# Patient Record
Sex: FEMALE | URBAN - METROPOLITAN AREA
[De-identification: names, ages, dates, MRNs, and addresses within clinical notes are randomized per-mention and may not be internally consistent; named-entity substitution may affect disease eponyms.]

---

## 2020-06-04 ENCOUNTER — LAB OUTSIDE AN ENCOUNTER (OUTPATIENT)
Dept: URBAN - METROPOLITAN AREA CLINIC 80 | Facility: CLINIC | Age: 65
End: 2020-06-04

## 2020-06-04 ENCOUNTER — OFFICE VISIT (OUTPATIENT)
Dept: URBAN - METROPOLITAN AREA CLINIC 80 | Facility: CLINIC | Age: 65
End: 2020-06-04
Payer: COMMERCIAL

## 2020-06-04 ENCOUNTER — DASHBOARD ENCOUNTERS (OUTPATIENT)
Age: 65
End: 2020-06-04

## 2020-06-04 DIAGNOSIS — D50.8 OTHER IRON DEFICIENCY ANEMIA: ICD-10-CM

## 2020-06-04 DIAGNOSIS — Z12.11 SCREENING FOR COLON CANCER: ICD-10-CM

## 2020-06-04 DIAGNOSIS — K59.09 OTHER CONSTIPATION: ICD-10-CM

## 2020-06-04 PROCEDURE — G8418 CALC BMI BLW LOW PARAM F/U: HCPCS | Performed by: INTERNAL MEDICINE

## 2020-06-04 PROCEDURE — 99213 OFFICE O/P EST LOW 20 MIN: CPT | Performed by: INTERNAL MEDICINE

## 2020-06-04 PROCEDURE — G9902 PT SCRN TBCO AND ID AS USER: HCPCS | Performed by: INTERNAL MEDICINE

## 2020-06-04 PROCEDURE — 3017F COLORECTAL CA SCREEN DOC REV: CPT | Performed by: INTERNAL MEDICINE

## 2020-06-04 NOTE — PHYSICAL EXAM CONSTITUTIONAL:
Underweight/frail, using wheeling walker , in no acute distress , ambulating without difficulty , normal communication ability

## 2020-06-04 NOTE — HPI-TODAY'S VISIT:
Unclear as to reason for visit today however on review of labs +mild anemia; no overt bleeding and no hx GI eval or colonoscopy Pt states primary complaint is numbness of her hands and feet; she is using a rolling walker +constipation, responds to miralax

## 2020-06-11 ENCOUNTER — WEB ENCOUNTER (OUTPATIENT)
Dept: URBAN - METROPOLITAN AREA CLINIC 80 | Facility: CLINIC | Age: 65
End: 2020-06-11

## 2020-06-13 LAB
BASO (ABSOLUTE): 0
BASOS: 0
EOS (ABSOLUTE): 0.2
EOS: 2
FERRITIN, SERUM: 36
FOLATE (FOLIC ACID), SERUM: 16.3
HEMATOCRIT: 32.3
HEMATOLOGY COMMENTS:: (no result)
HEMOGLOBIN: 10.5
IMMATURE CELLS: (no result)
IMMATURE GRANS (ABS): 0
IMMATURE GRANULOCYTES: 0
IRON BIND.CAP.(TIBC): 318
IRON SATURATION: 8
IRON: 26
LYMPHS (ABSOLUTE): 1.3
LYMPHS: 12
MCH: 31.2
MCHC: 32.5
MCV: 96
MONOCYTES(ABSOLUTE): 0.9
MONOCYTES: 8
NEUTROPHILS (ABSOLUTE): 8.2
NEUTROPHILS: 78
NRBC: (no result)
PLATELETS: 355
RBC: 3.37
RDW: 24
T4,FREE(DIRECT): 0.93
TSH: 2.05
UIBC: 292
VITAMIN B12: 515
WBC: 10.6

## 2020-07-22 ENCOUNTER — OFFICE VISIT (OUTPATIENT)
Dept: URBAN - METROPOLITAN AREA SURGERY CENTER 19 | Facility: SURGERY CENTER | Age: 65
End: 2020-07-22